# Patient Record
Sex: FEMALE | Race: WHITE | NOT HISPANIC OR LATINO | Employment: UNEMPLOYED | URBAN - METROPOLITAN AREA
[De-identification: names, ages, dates, MRNs, and addresses within clinical notes are randomized per-mention and may not be internally consistent; named-entity substitution may affect disease eponyms.]

---

## 2021-08-25 NOTE — PROGRESS NOTES
"Chief Complaint  Leg Swelling and Abnormal ECG      History of Present Illness  Tri Monroe presents to [facility] CARDIOLOGY  Patient is a 46-year-old female who recently was at her PCPs office for routine checkup was found to have an abnormal EKG.  She does report some lower extremity edema issues which she notices more as the day goes on she has not had any chest pain problems or shortness of breath issues noticeable from her baseline    History reviewed. No pertinent past medical history.      Current Outpatient Medications:   •  escitalopram (LEXAPRO) 10 MG tablet, Daily., Disp: , Rfl:   •  SUMAtriptan (IMITREX) 50 MG tablet, As Needed., Disp: , Rfl:     There are no discontinued medications.  No Known Allergies     Social History     Tobacco Use   • Smoking status: Former Smoker   • Smokeless tobacco: Never Used   Vaping Use   • Vaping Use: Every day   • Substances: Nicotine   • Devices: Refillable tank   Substance Use Topics   • Alcohol use: Yes   • Drug use: Never     Dad with history of congestive heart failure  Uncle with a prior myocardial infarction in 70s    History reviewed. No pertinent family history.     Objective     /80 (BP Location: Right arm, Patient Position: Sitting)   Pulse 82   Ht 160 cm (63\")   Wt 85.3 kg (188 lb)   BMI 33.30 kg/m²       Physical Exam  Constitutional:       General: He is awake. He is not in acute distress.     Appearance: Normal appearance.   Neck:      Vascular: No carotid bruit, hepatojugular reflux or JVD.   Cardiovascular:      Rate and Rhythm: Normal rate and regular rhythm.      Chest Wall: PMI is not displaced.      Heart sounds: Normal heart sounds, S1 normal and S2 normal. No murmur heard.   No friction rub. No gallop. No S3 or S4 sounds.    Pulmonary:      Effort: Pulmonary effort is normal.      Breath sounds: Normal breath sounds. No wheezing, rhonchi or rales.   Ext.     Trace bilateral lower extremity edema  Skin:     General: " Skin is warm and dry.      Coloration: Skin is not cyanotic.      Findings: No petechiae or rash.   Neurological:      Mental Status: He is alert.   Psychiatric:         Behavior: Behavior is cooperative.       Result Review :     No results found for: PROBNP       EKG 6/22/2021  Normal sinus rhythm with borderline inferior Q waves consider possible old inferior wall MI      No results found for this or any previous visit.     Creatinine 0.95  Potassium 4.9  HDL 52    TSH 1.68              Diagnoses and all orders for this visit:    1. Abnormal EKG (Primary)  Assessment & Plan:  Patient with mildly abnormal EKG concerning for possible old inferior wall MI versus lead placement we will check a echocardiogram to evaluate for any wall motion abnormalities if not then feel this is just a benign variant    Orders:  -     Adult Transthoracic Echo Complete W/ Cont if Necessary Per Protocol; Future          Follow Up     No follow-ups on file.          Patient was given instructions and counseling regarding her condition or for health maintenance advice. Please see specific information pulled into the AVS if appropriate.

## 2021-08-26 ENCOUNTER — OFFICE VISIT (OUTPATIENT)
Dept: CARDIOLOGY | Facility: CLINIC | Age: 46
End: 2021-08-26

## 2021-08-26 VITALS
BODY MASS INDEX: 33.31 KG/M2 | WEIGHT: 188 LBS | SYSTOLIC BLOOD PRESSURE: 119 MMHG | HEIGHT: 63 IN | DIASTOLIC BLOOD PRESSURE: 80 MMHG | HEART RATE: 82 BPM

## 2021-08-26 DIAGNOSIS — R94.31 ABNORMAL EKG: Primary | ICD-10-CM

## 2021-08-26 PROCEDURE — 99203 OFFICE O/P NEW LOW 30 MIN: CPT | Performed by: INTERNAL MEDICINE

## 2021-08-26 RX ORDER — ESCITALOPRAM OXALATE 10 MG/1
TABLET ORAL DAILY
COMMUNITY
Start: 2021-06-22

## 2021-08-26 RX ORDER — SUMATRIPTAN 50 MG/1
TABLET, FILM COATED ORAL AS NEEDED
COMMUNITY
Start: 2021-08-14

## 2021-08-26 NOTE — ASSESSMENT & PLAN NOTE
Patient with mildly abnormal EKG concerning for possible old inferior wall MI versus lead placement we will check a echocardiogram to evaluate for any wall motion abnormalities if not then feel this is just a benign variant

## 2021-09-30 ENCOUNTER — TELEPHONE (OUTPATIENT)
Dept: CARDIOLOGY | Facility: CLINIC | Age: 46
End: 2021-09-30

## 2021-09-30 NOTE — TELEPHONE ENCOUNTER
----- Message from SUELLEN Mari sent at 9/30/2021 11:33 AM EDT -----  Notify pt echo result is normal  Follow up as needed